# Patient Record
Sex: FEMALE | URBAN - METROPOLITAN AREA
[De-identification: names, ages, dates, MRNs, and addresses within clinical notes are randomized per-mention and may not be internally consistent; named-entity substitution may affect disease eponyms.]

---

## 2018-10-09 ENCOUNTER — APPOINTMENT (RX ONLY)
Dept: URBAN - METROPOLITAN AREA CLINIC 12 | Facility: CLINIC | Age: 63
Setting detail: DERMATOLOGY
End: 2018-10-09

## 2018-10-09 DIAGNOSIS — D49.2 NEOPLASM OF UNSPECIFIED BEHAVIOR OF BONE, SOFT TISSUE, AND SKIN: ICD-10-CM

## 2018-10-09 DIAGNOSIS — Z41.1 ENCOUNTER FOR COSMETIC SURGERY: ICD-10-CM

## 2018-10-09 PROCEDURE — ? PRE-OP WORKLIST

## 2018-10-09 PROCEDURE — ? COSMETIC CONSULTATION: CHEMICAL PEELS

## 2018-10-09 PROCEDURE — 11100: CPT

## 2018-10-09 PROCEDURE — ? COUNSELING - NEOPLASM OF UNSPECIFIED BEHAVIOR

## 2018-10-09 PROCEDURE — ? COSMETIC CONSULTATION: THERMI

## 2018-10-09 PROCEDURE — ? PATIENT SPECIFIC COUNSELING

## 2018-10-09 PROCEDURE — ? BIOPSY BY PUNCH METHOD

## 2018-10-09 PROCEDURE — ? SEPARATE AND IDENTIFIABLE DOCUMENTATION

## 2018-10-09 PROCEDURE — 99212 OFFICE O/P EST SF 10 MIN: CPT | Mod: 25

## 2018-10-09 PROCEDURE — ? CONSULTATION - LIPOSUCTION

## 2018-10-09 ASSESSMENT — LOCATION SIMPLE DESCRIPTION DERM
LOCATION SIMPLE: RIGHT EYEBROW
LOCATION SIMPLE: RIGHT UPPER ARM
LOCATION SIMPLE: LEFT UPPER ARM
LOCATION SIMPLE: LEFT CHEEK
LOCATION SIMPLE: RIGHT CHEEK
LOCATION SIMPLE: INFERIOR FOREHEAD

## 2018-10-09 ASSESSMENT — LOCATION DETAILED DESCRIPTION DERM
LOCATION DETAILED: RIGHT INFERIOR CENTRAL MALAR CHEEK
LOCATION DETAILED: INFERIOR MID FOREHEAD
LOCATION DETAILED: LEFT INFERIOR CENTRAL MALAR CHEEK
LOCATION DETAILED: LEFT ANTERIOR PROXIMAL UPPER ARM
LOCATION DETAILED: RIGHT LATERAL EYEBROW
LOCATION DETAILED: RIGHT ANTERIOR DISTAL UPPER ARM
LOCATION DETAILED: RIGHT CENTRAL EYEBROW

## 2018-10-09 ASSESSMENT — LOCATION ZONE DERM
LOCATION ZONE: ARM
LOCATION ZONE: FACE

## 2018-10-09 NOTE — PROCEDURE: COSMETIC CONSULTATION: CHEMICAL PEELS
Detail Level: Zone
Consultation Charge $ (Use Numbers Only, No Special Characters Or $): 0
Send Procedure Quote As Charge: No

## 2018-10-09 NOTE — PROCEDURE: PATIENT SPECIFIC COUNSELING
Detail Level: Simple
Other (Free Text): Dr. James recommended the patient have a biopsy of the pigmented lesion to rule our malignancy before he treats the area with either a chemical peel or hydroquinone. The patient confirmed the biopsy today.
Detail Level: Zone
Other (Free Text): The patient expressed interest in a chemical peel for her entire face. Dr. James informed her she is a good candidate for a TCA peel in office, as long as her biopsy returns as benign. He educated her this peel will require about 7-14 days of recovery, and the patient will most likely experience a lot of redness in her face before intense peeling for many days. The patient expressed she would like to have this performed in 2 weeks, so Azalea stepped in to discuss quotes and scheduling. Katja also stepped in to discuss products to use before her peel to improve the peel’s benefits. She recommended the patient begin applying [employer] Phloretin CF serum each morning and [employer] Phyto Corrective Gel twice daily to her face, and she furnished the patient with samples. She additionally recommended the patient begin applying a Retinol 0.025% product to her face nightly, which she purchased at check out. The patient verbalized understanding to all discussed, and had her pre-operative appointment for her chemical peel today in office as well. She was furnished with all medications besides TriLuma, which was ordered and the patient was informed the pharmacy will call her for billing and shipment options.
Other (Free Text): The patient voiced interest in removing the excess fat on her upper arms. Dr. James informed the patient she is a good candidate for upper arm liposuction to remove the fat, along with ThermiTight to tighten the laxity of the skin in the area. He explained this could be performed in office, and she voiced interest in this procedure in the future.

## 2018-10-09 NOTE — PROCEDURE: BIOPSY BY PUNCH METHOD
Estimated Blood Loss (Cc): scant
Surgeon: Dr. James
Notification Instructions: Patient will be notified of biopsy results. However, the patient was instructed to call the office if not contacted within 2 weeks.
Consent: Written consent was obtained and risks, benefits, expectations and alternatives were reviewed including, but not limited to, scarring, infection, bleeding, scabbing, incomplete removal, nerve damage and allergy to anesthesia.
Anesthesia Volume In Cc: 0.3
Anesthesia Type: 1% lidocaine with epinephrine
Biopsy Type: H and E
Number Of Epidermal Sutures (Optional): 1
Lab: [id number]
Detail Level: Detailed
Lab Facility: [id number]
Epidermal Sutures: 6-0 Prolene
X Depth Of Punch In Cm (Optional): 0
Suture Removal: 5 days
Bill For Surgical Tray: no
Punch Size In Mm: 2
Hemostasis: None

## 2018-10-09 NOTE — PROCEDURE: PRE-OP WORKLIST
Date Of Evaluation: 10/09/2018
Detail Level: Simple
Date Of Surgery: TBD
Photos Taken?: photos to be taken on day of procedure
Surgeon: Dr. James
Surgery Scheduled: 10/23/2018

## 2018-10-09 NOTE — PROCEDURE: CONSULTATION - LIPOSUCTION
Detail Level: Zone
Consultation Charge $ (Use Numbers Only, No Text Please.): 0
Send Procedure Quote As Charge: No

## 2018-10-23 ENCOUNTER — APPOINTMENT (RX ONLY)
Dept: URBAN - METROPOLITAN AREA CLINIC 12 | Facility: CLINIC | Age: 63
Setting detail: DERMATOLOGY
End: 2018-10-23

## 2018-10-23 DIAGNOSIS — Z41.1 ENCOUNTER FOR COSMETIC SURGERY: ICD-10-CM

## 2018-10-23 PROCEDURE — ? CHEMICAL PEEL JESSNER/TCA

## 2018-10-23 ASSESSMENT — LOCATION SIMPLE DESCRIPTION DERM: LOCATION SIMPLE: RIGHT CHEEK

## 2018-10-23 ASSESSMENT — LOCATION ZONE DERM: LOCATION ZONE: FACE

## 2018-10-23 ASSESSMENT — LOCATION DETAILED DESCRIPTION DERM: LOCATION DETAILED: RIGHT INFERIOR CENTRAL MALAR CHEEK

## 2018-10-23 NOTE — PROCEDURE: CHEMICAL PEEL JESSNER/TCA
Chemical Peel: 30% TCA
Treatment Number: 1
Consent: Prior to the procedure, written consent was obtained and risks, benefits, expectations and alternatives were reviewed, including, but not limited to,  redness, peeling, blistering, pigmentary change, scarring, infection, and pain.
Frost (0,1+,2+,3+,4+): 2+
Price $ (Use Numbers Only, No Text Please.): 1950
Prep: The treated area was degreased with pre-peel cleanser, and vaseline was applied for protection of mucous membranes.
Detail Level: Zone
Number Of Coats: 3

## 2018-10-26 ENCOUNTER — APPOINTMENT (RX ONLY)
Dept: URBAN - METROPOLITAN AREA CLINIC 12 | Facility: CLINIC | Age: 63
Setting detail: DERMATOLOGY
End: 2018-10-26

## 2018-10-26 DIAGNOSIS — Z42.8 ENCOUNTER FOR OTHER PLASTIC AND RECONSTRUCTIVE SURGERY FOLLOWING MEDICAL PROCEDURE OR HEALED INJURY: ICD-10-CM

## 2018-10-26 PROCEDURE — ? PATIENT SPECIFIC COUNSELING

## 2018-10-26 NOTE — PROCEDURE: PATIENT SPECIFIC COUNSELING
Other (Free Text): Patient presents s/p TCA peel . Patient is very swollen, however Dr. James explained that this I a normal response to the peel. Dr. James explained that the
Detail Level: Simple

## 2018-11-05 ENCOUNTER — APPOINTMENT (RX ONLY)
Dept: URBAN - METROPOLITAN AREA CLINIC 12 | Facility: CLINIC | Age: 63
Setting detail: DERMATOLOGY
End: 2018-11-05

## 2018-11-05 DIAGNOSIS — Z42.8 ENCOUNTER FOR OTHER PLASTIC AND RECONSTRUCTIVE SURGERY FOLLOWING MEDICAL PROCEDURE OR HEALED INJURY: ICD-10-CM

## 2018-11-05 PROCEDURE — ? CODE 99024 - NO CHARGE POST-OP VISIT

## 2018-11-05 PROCEDURE — 99024 POSTOP FOLLOW-UP VISIT: CPT

## 2018-11-05 PROCEDURE — ? PATIENT SPECIFIC COUNSELING

## 2018-11-05 NOTE — PROCEDURE: PATIENT SPECIFIC COUNSELING
Detail Level: Simple
Other (Free Text): Patient presents s/p TCA peel. Patient states that she is using the vinegar soaks, and she has been experiencing some itching. Dr. James states that that he’s pleased with her progress Dr. James suggested that she continue using the hydro balm , and explained that once her healing has progressed further, then he would like for her to start applying 1%  hydrocortisone cream  to help reduce the redness. Patient verbalized understanding.

## 2018-11-26 ENCOUNTER — APPOINTMENT (RX ONLY)
Dept: URBAN - METROPOLITAN AREA CLINIC 12 | Facility: CLINIC | Age: 63
Setting detail: DERMATOLOGY
End: 2018-11-26

## 2018-11-26 DIAGNOSIS — Z42.8 ENCOUNTER FOR OTHER PLASTIC AND RECONSTRUCTIVE SURGERY FOLLOWING MEDICAL PROCEDURE OR HEALED INJURY: ICD-10-CM

## 2018-11-26 PROCEDURE — ? PATIENT SPECIFIC COUNSELING

## 2018-11-26 PROCEDURE — 99024 POSTOP FOLLOW-UP VISIT: CPT

## 2018-11-26 PROCEDURE — ? CODE 99024 - NO CHARGE POST-OP VISIT

## 2018-11-26 NOTE — PROCEDURE: PATIENT SPECIFIC COUNSELING
Detail Level: Simple
Other (Free Text): Patient presents s/p TCA peel. Patient states she’s doing well, however, patient states that her forehead and chin are still very red. Dr. James informed patient that he will move her up from the hydrocortisone cream, to Fluocinaide .05%  patient was given a script in the office. Patient inquired about how to care for her skin after her skin has healed.  Dr. James recommended that she speak with out  Katja regarding her after skin care needs. Katja stepped in to consult with her.

## 2019-01-04 ENCOUNTER — APPOINTMENT (RX ONLY)
Dept: URBAN - METROPOLITAN AREA CLINIC 12 | Facility: CLINIC | Age: 64
Setting detail: DERMATOLOGY
End: 2019-01-04

## 2019-01-04 DIAGNOSIS — Z42.8 ENCOUNTER FOR OTHER PLASTIC AND RECONSTRUCTIVE SURGERY FOLLOWING MEDICAL PROCEDURE OR HEALED INJURY: ICD-10-CM

## 2019-01-04 PROCEDURE — ? PATIENT SPECIFIC COUNSELING

## 2019-01-04 PROCEDURE — ? PHOTOS OBTAINED

## 2019-01-04 PROCEDURE — 99024 POSTOP FOLLOW-UP VISIT: CPT

## 2019-01-04 PROCEDURE — ? POST-OP CHECK

## 2019-01-04 PROCEDURE — ? SET GLOBAL PERIOD

## 2019-01-04 ASSESSMENT — LOCATION DETAILED DESCRIPTION DERM: LOCATION DETAILED: LEFT CHIN

## 2019-01-04 ASSESSMENT — LOCATION ZONE DERM: LOCATION ZONE: FACE

## 2019-01-04 ASSESSMENT — LOCATION SIMPLE DESCRIPTION DERM: LOCATION SIMPLE: CHIN

## 2019-01-04 NOTE — PROCEDURE: SET GLOBAL PERIOD
Other Surgery Performed: Jessner’s/TCA Peel
Detail Level: Simple
Surgery Global Period: 0
Date Of Surgery (Mm/Dd/Yyyy): 10/23/2018

## 2019-01-04 NOTE — PROCEDURE: POST-OP CHECK
Add Postop Global No-Charge Code (87093)?: yes
Wound Evaluated By: Dr. Harrison James
Detail Level: Simple

## 2019-01-04 NOTE — PROCEDURE: PATIENT SPECIFIC COUNSELING
Detail Level: Zone
Other (Free Text): Patient advised to continue on Photo Corrective as it is improving overall redness. Advised to discontinue CE Ferulic and start on Phloretin CF. Samples provides of [employer].

## 2019-01-04 NOTE — PROCEDURE: PATIENT SPECIFIC COUNSELING
Detail Level: Simple
Other (Free Text): The patient presents s/p TCA peel. Dr. James reassured the patient her face has improved greatly since her last visit. He encouraged the patient to continue using the facial skincare products Katja recommended to continue improving her skin texture and pigmentation. The patient verbalized understanding to all discussed and is to RTC in 1 month.

## 2019-01-04 NOTE — PROCEDURE: PHOTOS OBTAINED
Detail Level: Simple
Follow-Up For Additional Photos?: no
Follow-Up Interval: day
Photographed Locations: Photos were obtained of the surgical site(s).
Follow-Up Increment: 0